# Patient Record
Sex: FEMALE | Race: OTHER | NOT HISPANIC OR LATINO | Employment: UNEMPLOYED | URBAN - METROPOLITAN AREA
[De-identification: names, ages, dates, MRNs, and addresses within clinical notes are randomized per-mention and may not be internally consistent; named-entity substitution may affect disease eponyms.]

---

## 2023-05-09 ENCOUNTER — TELEPHONE (OUTPATIENT)
Dept: MATERNAL FETAL MEDICINE | Facility: CLINIC | Age: 28
End: 2023-05-09
Payer: MEDICAID

## 2023-05-09 DIAGNOSIS — Z36.82 ENCOUNTER FOR NUCHAL TRANSLUCENCY TESTING: Primary | ICD-10-CM

## 2023-05-09 NOTE — TELEPHONE ENCOUNTER
Called patient and she is scheduled on 06/14 at 1040 AM for a Consult and ultrasound with Maternal Fetal Medicine in [place].  Patient verbalized her understanding.      ----- Message from Claritza Bishop sent at 5/9/2023 10:23 AM CDT -----  Regarding: Referral  Good Morning    Jeni Haynes . JULIA would like to refer the following patient to [facility] to the [facility] department  I have a scanned the following patient's referral and records into .         Thank you,   Claritza Carlos

## 2023-06-14 ENCOUNTER — PROCEDURE VISIT (OUTPATIENT)
Dept: MATERNAL FETAL MEDICINE | Facility: CLINIC | Age: 28
End: 2023-06-14
Payer: MEDICAID

## 2023-06-14 ENCOUNTER — OFFICE VISIT (OUTPATIENT)
Dept: MATERNAL FETAL MEDICINE | Facility: CLINIC | Age: 28
End: 2023-06-14
Payer: MEDICAID

## 2023-06-14 VITALS
HEIGHT: 66 IN | SYSTOLIC BLOOD PRESSURE: 129 MMHG | BODY MASS INDEX: 37.16 KG/M2 | WEIGHT: 231.25 LBS | DIASTOLIC BLOOD PRESSURE: 79 MMHG

## 2023-06-14 DIAGNOSIS — Z36.82 ENCOUNTER FOR NUCHAL TRANSLUCENCY TESTING: ICD-10-CM

## 2023-06-14 DIAGNOSIS — Z36.89 ENCOUNTER FOR ULTRASOUND TO ASSESS FETAL GROWTH: Primary | ICD-10-CM

## 2023-06-14 DIAGNOSIS — Z82.79 FAMILY HISTORY OF SPINA BIFIDA: ICD-10-CM

## 2023-06-14 PROCEDURE — 3074F SYST BP LT 130 MM HG: CPT | Mod: CPTII,S$GLB,, | Performed by: OBSTETRICS & GYNECOLOGY

## 2023-06-14 PROCEDURE — 99203 PR OFFICE/OUTPT VISIT, NEW, LEVL III, 30-44 MIN: ICD-10-PCS | Mod: TH,25,S$GLB, | Performed by: OBSTETRICS & GYNECOLOGY

## 2023-06-14 PROCEDURE — 76813 OB US NUCHAL MEAS 1 GEST: CPT | Mod: S$GLB,,, | Performed by: OBSTETRICS & GYNECOLOGY

## 2023-06-14 PROCEDURE — 99203 OFFICE O/P NEW LOW 30 MIN: CPT | Mod: TH,25,S$GLB, | Performed by: OBSTETRICS & GYNECOLOGY

## 2023-06-14 PROCEDURE — 76813 US MFM PROCEDURE (VIEWPOINT): ICD-10-PCS | Mod: S$GLB,,, | Performed by: OBSTETRICS & GYNECOLOGY

## 2023-06-14 PROCEDURE — 3078F PR MOST RECENT DIASTOLIC BLOOD PRESSURE < 80 MM HG: ICD-10-PCS | Mod: CPTII,S$GLB,, | Performed by: OBSTETRICS & GYNECOLOGY

## 2023-06-14 PROCEDURE — 1159F PR MEDICATION LIST DOCUMENTED IN MEDICAL RECORD: ICD-10-PCS | Mod: CPTII,S$GLB,, | Performed by: OBSTETRICS & GYNECOLOGY

## 2023-06-14 PROCEDURE — 1159F MED LIST DOCD IN RCRD: CPT | Mod: CPTII,S$GLB,, | Performed by: OBSTETRICS & GYNECOLOGY

## 2023-06-14 PROCEDURE — 3008F PR BODY MASS INDEX (BMI) DOCUMENTED: ICD-10-PCS | Mod: CPTII,S$GLB,, | Performed by: OBSTETRICS & GYNECOLOGY

## 2023-06-14 PROCEDURE — 3078F DIAST BP <80 MM HG: CPT | Mod: CPTII,S$GLB,, | Performed by: OBSTETRICS & GYNECOLOGY

## 2023-06-14 PROCEDURE — 3008F BODY MASS INDEX DOCD: CPT | Mod: CPTII,S$GLB,, | Performed by: OBSTETRICS & GYNECOLOGY

## 2023-06-14 PROCEDURE — 3074F PR MOST RECENT SYSTOLIC BLOOD PRESSURE < 130 MM HG: ICD-10-PCS | Mod: CPTII,S$GLB,, | Performed by: OBSTETRICS & GYNECOLOGY

## 2023-06-14 RX ORDER — SYRINGE WITH NEEDLE, INSULIN
SYRINGE, EMPTY DISPOSABLE MISCELLANEOUS
COMMUNITY

## 2023-06-14 NOTE — ASSESSMENT & PLAN NOTE
The patient has a strong family history of spina bifida: her sister, mother's brother, and mother's mother all had spina bifida. The patient has not had a previous child affected with a neural tube defect.  We discussed the fact that, while the family history does likely place this fetus at a slightly increased risk for NTD, the risk is highest in patients with a first degree relative, or multiple first degree relatives, who have had a NTD. We reviewed screening options for NTD including msAFP and targeted anatomy ultrasound.   Ultrasound appears normal today. No evidence of anencephaly. The spine is not well visualized but appears normal on limited views obtained today.  Will schedule the patient for targeted anatomy survey at 19-20 weeks.   After discussion of aneuploidy screening options, the patient opts for NIPT. This was ordered today.  Primary OB can order msAFP at 16-20 weeks.

## 2023-06-14 NOTE — PROGRESS NOTES
Maternal Fetal Medicine consult    SUBJECTIVE:     Kary Guerra is a 28 y.o.  female with IUP at 12w2d who is seen in consultation by MFM for evaluation and management of:  Problem   Family History of Spina Bifida       OB HX:  OB History    Para Term  AB Living   3 2 2     2   SAB IAB Ectopic Multiple Live Births           2      # Outcome Date GA Lbr Lalit/2nd Weight Sex Delivery Anes PTL Lv   3 Current            2 Term 23 39w0d  3.317 kg (7 lb 5 oz) M Vag-Spont  N RAGHU   1 Term 12/01/15 40w0d  3.969 kg (8 lb 12 oz) F Vag-Spont  N RAGHU       History reviewed. No pertinent past medical history.  History reviewed. No pertinent surgical history.  Family history: Family history of spina bifida. Otherwise negative for birth defects, recurrent miscarriages, chromosomal abnormalities.   Social History     Tobacco Use    Smoking status: Never    Smokeless tobacco: Never     Review of patient's allergies indicates:  No Known Allergies  Medications:  Current Outpatient Medications on File Prior to Visit   Medication Sig Dispense Refill    PNV133-ferrous fumarate-FA (PRENATAL)  mg-mcg Tab       IRON, CARBONYL ORAL Take 1 tablet by mouth once daily.       No current facility-administered medications on file prior to visit.       Aneuploidy screening:   none    Records reviewed    OBJECTIVE:     Blood Pressure: 129/79    Ultrasound performed. See viewpoint for full ultrasound report.      ASSESSMENT/PLAN:     28 y.o.  female with IUP at 12w2d    Family history of spina bifida  The patient has a strong family history of spina bifida: her sister, mother's brother, and mother's mother all had spina bifida. The patient has not had a previous child affected with a neural tube defect.  We discussed the fact that, while the family history does likely place this fetus at a slightly increased risk for NTD, the risk is highest in patients with a first degree relative, or multiple first degree  relatives, who have had a NTD. We reviewed screening options for NTD including msAFP and targeted anatomy ultrasound.   Ultrasound appears normal today. No evidence of anencephaly. The spine is not well visualized but appears normal on limited views obtained today.  Will schedule the patient for targeted anatomy survey at 19-20 weeks.   After discussion of aneuploidy screening options, the patient opts for NIPT. This was ordered today.  Primary OB can order msAFP at 16-20 weeks.      Today I have spent 30 minutes in the care of the patient. This includes face to face time and non-face to face time preparing to see the patient (eg, review of tests), obtaining and/or reviewing separately obtained history, documenting clinical information in the electronic or other health record, independently interpreting results and communicating results to the patient/family/caregiver, or care coordination.     Maddie Pulliam MD  Maternal Fetal Medicine

## 2023-06-28 ENCOUNTER — TELEPHONE (OUTPATIENT)
Dept: MATERNAL FETAL MEDICINE | Facility: CLINIC | Age: 28
End: 2023-06-28
Payer: MEDICAID

## 2023-08-07 ENCOUNTER — PROCEDURE VISIT (OUTPATIENT)
Dept: MATERNAL FETAL MEDICINE | Facility: CLINIC | Age: 28
End: 2023-08-07
Payer: MEDICAID

## 2023-08-07 DIAGNOSIS — Z36.89 ENCOUNTER FOR ULTRASOUND TO ASSESS FETAL GROWTH: Primary | ICD-10-CM

## 2023-08-07 DIAGNOSIS — Z36.89 ENCOUNTER FOR ULTRASOUND TO ASSESS FETAL GROWTH: ICD-10-CM

## 2023-08-07 PROCEDURE — 76811 OB US DETAILED SNGL FETUS: CPT | Mod: S$GLB,,, | Performed by: OBSTETRICS & GYNECOLOGY

## 2023-08-07 PROCEDURE — 76811 US MFM PROCEDURE (VIEWPOINT): ICD-10-PCS | Mod: S$GLB,,, | Performed by: OBSTETRICS & GYNECOLOGY
